# Patient Record
Sex: MALE | Race: WHITE
[De-identification: names, ages, dates, MRNs, and addresses within clinical notes are randomized per-mention and may not be internally consistent; named-entity substitution may affect disease eponyms.]

---

## 2019-10-31 ENCOUNTER — HOSPITAL ENCOUNTER (EMERGENCY)
Dept: HOSPITAL 74 - FER | Age: 29
Discharge: HOME | End: 2019-10-31
Payer: COMMERCIAL

## 2019-10-31 VITALS — TEMPERATURE: 98.4 F | SYSTOLIC BLOOD PRESSURE: 129 MMHG | HEART RATE: 80 BPM | DIASTOLIC BLOOD PRESSURE: 87 MMHG

## 2019-10-31 VITALS — BODY MASS INDEX: 27.9 KG/M2

## 2019-10-31 DIAGNOSIS — G44.009: Primary | ICD-10-CM

## 2019-10-31 NOTE — PDOC
Attending Attestation





- Resident


Resident Name: Chantal Manning





- ED Attending Attestation


I have performed the following: I have examined & evaluated the patient, The 

case was reviewed & discussed with the resident, I agree w/resident's findings 

& plan





- HPI


HPI: 





10/31/19 16:54


29y M with no significant PMH presenting to ED with complaints of HA upon 

waking up this morning, described as retro-orbital on right side, 

photosensitivity, eye tearing and runny nose.


 Denies changes in vision, injury, neck pain, numbness/tingling, fevers chills, 

sob, chest pain, cough, weakness. His sister has a history of migraine 

headaches. He has not taken anything for the pain.denies any trauma or 

infectious sx. no neck stiffness. no fevers. lives/from [place] with job orientation/

training in [place]


 





- Physicial Exam


PE: 





10/31/19 16:55


Agree with the resident's HPI and PE as documented in the electronic medical 

record.


NAD, Alert, oriented appropriately. well appearing, NCAT, PERRL, EOMI, clear 

conjunctiva, anicteric, moist mucus membranes, oropharynx clear. Airway patent, 

normal phonation. no meningismus, neck supple. lungs clear, RRR, abdomen soft 

nontender. no rebound, guarding. Back nontender. REDMOND x4, no focal neuro 

deficits. No peripheral edema. normal color for ethnicity, WWP.  


CN II-XII grossly intact. Strength prox and distally 5/5 throughout. Sensation 

grossly intact to light touch. REDMOND x4. No cerebellar signs,  Speech clear. 


   














- Medical Decision Making





10/31/19 16:57


Vital Signs











Temp Pulse Resp BP Pulse Ox


 


 98.4 F   80   16   129/87   100 


 


 10/31/19 15:35  10/31/19 15:35  10/31/19 15:35  10/31/19 15:35  10/31/19 16:00








vitals wnl, no fever. nontoxic appearing.


Differential diagnosis includes tension headache, migraine, cluster headache.  

No focal neurologic deficits, no seizure or altered mental status to suggest 

intracranial pathology.  No trauma.  No infectious symptoms to suggest 

meningitis or encephalitis.  Will give analgesia and oxygen as symptoms are 

unilateral with syndrome consistent with cluster headache picture.  Will offer 

neurology follow-up referrals patient is from [place] so when he returns 

he should follow-up with neurologist.  Return precautions provided.  Discharged 

in stable condition.


10/31/19 16:58

## 2019-10-31 NOTE — PDOC
History of Present Illness





- General


Chief Complaint: Headache


Stated Complaint: HEADACHE


Time Seen by Provider: 10/31/19 15:38


History Source: Patient


Exam Limitations: No Limitations





- History of Present Illness


Initial Comments: 





10/31/19 16:10


29y M with no significant PMH presenting to ED with complaints of HA. Patient 

says he woke up this morning with headache and says it has been getting 

progressively worse. He has had headaches before but today it feels different. 

He says the pain is behind the R eye, feels sharp. Endorses tearing and runny 

nose. Denies changes in vision, injury, neck pain, numbness/tingling, fevers 

chills, sob, chest pain, cough, weakness. His sister has a history of migraine 

headaches. He has not taken anything for the pain. Pain is slightly relieved 

with pressure applied to the eye. 


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 He says that he will be 

staying in a hotel tonight due to the headache. 





Past History





- Past Medical History


Allergies/Adverse Reactions: 


 Allergies











Allergy/AdvReac Type Severity Reaction Status Date / Time


 


No Known Allergies Allergy   Verified 10/31/19 15:36











Home Medications: 


Ambulatory Orders





NK [No Known Home Medication]  10/31/19 








COPD: No





- Psycho Social/Smoking Cessation Hx


Smoking History: Never smoked


Hx Alcohol Use: No


Drug/Substance Use Hx: No





**Review of Systems





- Review of Systems


Constitutional: No: Symptoms Reported


HEENTM: Yes: See HPI


Respiratory: No: Symptoms reported


Cardiac (ROS): No: Symptoms Reported


ABD/GI: No: Symptoms Reported


Musculoskeletal: No: Symptoms Reported


Integumentary: No: Symptoms Reported


Neurological: Yes: Headache.  No: Numbness, Tingling, Weakness





*Physical Exam





- Vital Signs


 Last Vital Signs











Temp Pulse Resp BP Pulse Ox


 


 98.4 F   80   16   129/87   100 


 


 10/31/19 15:35  10/31/19 15:35  10/31/19 15:35  10/31/19 15:35  10/31/19 16:00














- Physical Exam


General Appearance: Yes: Nourished, Appropriately Dressed.  No: Apparent 

Distress


HEENT: positive: EOMI (without pain), SKYLER, Other (no conjunctival injection).  

negative: Photophobia, Scleral Icterus (R), Scleral Icterus (L), Sinus 

Tenderness


Neck: positive: Trachea midline, Supple


Respiratory/Chest: positive: Lungs Clear, Normal Breath Sounds.  negative: 

Crackles, Rales, Rhonchi, Stridor, Wheezing


Cardiovascular: positive: Regular Rhythm, Regular Rate, S1, S2.  negative: Edema

, JVD, Murmur


Gastrointestinal/Abdominal: positive: Normal Bowel Sounds, Soft.  negative: 

Tender


Musculoskeletal: negative: Vertebral Tenderness


Extremity: positive: Normal Capillary Refill


Integumentary: positive: Normal Color, Dry, Warm


Neurologic: positive: CNs II-XII NML intact, Fully Oriented, Alert, Normal Mood/

Affect, Normal Response, Motor Strength 5/5





ED Treatment Course





- Medications


Given in the ED: 


ED Medications














Discontinued Medications














Generic Name Dose Route Start Last Admin





  Trade Name Freq  PRN Reason Stop Dose Admin


 


Ibuprofen  600 mg  10/31/19 15:54  10/31/19 16:05





  Motrin -  PO  10/31/19 15:55  600 mg





  ONCE ONE   Administration





     





     





     





     














Medical Decision Making





- Medical Decision Making





10/31/19 16:14


29y M presenting with R sided headaches. 


vitals wnl


pe: benign. no neurological deficits. 





ddx includes but not limited to cluster ha, migraine ha, tension ha, sah, gca. 

low suspicion for gca given patient's age and no comorbidities. low suspicion 

for glaucoma. likely cluster headache given symptoms. 





will give ibuprofen 600mg and oxygen therapy 2L NC. 


reassess





10/31/19 17:02


pt feels the same, not improved. 


upon review of literature, recommended therapy for oxygen is 12L by [facility]. will 

place patient with [facility] and reassess





10/31/19 17:58


pain improved, patient feeling better. will advise patient to take nsaids/

tylenol for ha. 


given referral to neurology. advised patient to see neurologist in [place]. 


dispo: home





Discharge





- Discharge Information


Problems reviewed: Yes


Clinical Impression/Diagnosis: 


Cluster headache


Qualifiers:


 Headache chronicity pattern: unspecified pattern Intractability: not 

intractable Qualified Code(s): G44.009 - Cluster headache syndrome, unspecified

, not intractable





Condition: Good





- Admission


No





- Follow up/Referral


Referrals: 


Theo Rai MD [Staff Physician] - 





- Patient Discharge Instructions


Patient Printed Discharge Instructions:  DI for Headache, DI for Cluster 

Headache


Additional Instructions: 


You were seen in the emergency room today for a headache. This is likely a 

cluster headache. 





I recommend taking NSAIDs like ibuprofen or Advil for the pain as needed. 


I also recommend making an appointment with your primary care doctor or a 

neurologist for further evaluation. 


A referral has been provided below. 





Come back to the emergency room for worsening headache, if you lose 

consciousness, have changes in your vision, have numbness or weakness or if any 

new or concerning symptom develops. 





Thank you





- Post Discharge Activity


Work/Back to School Note:  Back to Work